# Patient Record
Sex: MALE | Race: WHITE | URBAN - METROPOLITAN AREA
[De-identification: names, ages, dates, MRNs, and addresses within clinical notes are randomized per-mention and may not be internally consistent; named-entity substitution may affect disease eponyms.]

---

## 2017-06-04 ENCOUNTER — EMERGENCY (EMERGENCY)
Facility: HOSPITAL | Age: 52
LOS: 1 days | Discharge: HOME | End: 2017-06-04
Attending: EMERGENCY MEDICINE | Admitting: EMERGENCY MEDICINE
Payer: MEDICAID

## 2017-06-04 VITALS
WEIGHT: 149.91 LBS | TEMPERATURE: 98 F | HEART RATE: 78 BPM | RESPIRATION RATE: 18 BRPM | DIASTOLIC BLOOD PRESSURE: 98 MMHG | OXYGEN SATURATION: 100 % | SYSTOLIC BLOOD PRESSURE: 151 MMHG | HEIGHT: 68 IN

## 2017-06-04 PROCEDURE — 73140 X-RAY EXAM OF FINGER(S): CPT | Mod: 26,LT

## 2017-06-04 PROCEDURE — 99283 EMERGENCY DEPT VISIT LOW MDM: CPT | Mod: 25

## 2017-06-04 RX ORDER — CEPHALEXIN 500 MG
500 CAPSULE ORAL ONCE
Qty: 0 | Refills: 0 | Status: COMPLETED | OUTPATIENT
Start: 2017-06-04 | End: 2017-06-04

## 2017-06-04 RX ORDER — CEPHALEXIN 500 MG
1 CAPSULE ORAL
Qty: 28 | Refills: 0 | OUTPATIENT
Start: 2017-06-04 | End: 2017-06-11

## 2017-06-04 RX ADMIN — Medication 500 MILLIGRAM(S): at 21:18

## 2017-06-04 NOTE — ED PROVIDER NOTE - PHYSICAL EXAMINATION
L first finger, erythema and mild edema over pad of finger, TTP, no induration, no flucutance, skin intact

## 2017-06-04 NOTE — ED PROVIDER NOTE - MEDICAL DECISION MAKING DETAILS
patient with L thumb swelling and pain x 2-3 days that he believes may have been a retained splinter or prick from a prema thorn. will do xray to evaluate for foreign body and put on keflex. advised follow up with PCP and dermatologist

## 2017-06-04 NOTE — ED ADULT TRIAGE NOTE - CHIEF COMPLAINT QUOTE
Patient to ED with complaint of left thumb pain and swelling.  Believes it is due to a splinter and that it is becoming infected.  Patient in no distress

## 2017-06-08 DIAGNOSIS — L03.012 CELLULITIS OF LEFT FINGER: ICD-10-CM

## 2020-11-07 ENCOUNTER — EMERGENCY (EMERGENCY)
Facility: HOSPITAL | Age: 55
LOS: 1 days | Discharge: HOME | End: 2020-11-07
Attending: EMERGENCY MEDICINE | Admitting: EMERGENCY MEDICINE
Payer: COMMERCIAL

## 2020-11-07 VITALS
SYSTOLIC BLOOD PRESSURE: 160 MMHG | HEIGHT: 68 IN | WEIGHT: 154.98 LBS | RESPIRATION RATE: 16 BRPM | HEART RATE: 98 BPM | OXYGEN SATURATION: 96 % | TEMPERATURE: 99 F | DIASTOLIC BLOOD PRESSURE: 93 MMHG

## 2020-11-07 VITALS
OXYGEN SATURATION: 96 % | TEMPERATURE: 98 F | DIASTOLIC BLOOD PRESSURE: 103 MMHG | SYSTOLIC BLOOD PRESSURE: 152 MMHG | RESPIRATION RATE: 16 BRPM | HEART RATE: 66 BPM

## 2020-11-07 DIAGNOSIS — M10.9 GOUT, UNSPECIFIED: ICD-10-CM

## 2020-11-07 DIAGNOSIS — M79.672 PAIN IN LEFT FOOT: ICD-10-CM

## 2020-11-07 LAB
ANION GAP SERPL CALC-SCNC: 11 MMOL/L — SIGNIFICANT CHANGE UP (ref 9–16)
BUN SERPL-MCNC: 13 MG/DL — SIGNIFICANT CHANGE UP (ref 7–23)
CALCIUM SERPL-MCNC: 8.8 MG/DL — SIGNIFICANT CHANGE UP (ref 8.5–10.5)
CHLORIDE SERPL-SCNC: 105 MMOL/L — SIGNIFICANT CHANGE UP (ref 96–108)
CO2 SERPL-SCNC: 25 MMOL/L — SIGNIFICANT CHANGE UP (ref 22–31)
CREAT SERPL-MCNC: 0.92 MG/DL — SIGNIFICANT CHANGE UP (ref 0.5–1.3)
GLUCOSE SERPL-MCNC: 99 MG/DL — SIGNIFICANT CHANGE UP (ref 70–99)
HCT VFR BLD CALC: 39.6 % — SIGNIFICANT CHANGE UP (ref 39–50)
HGB BLD-MCNC: 13.2 G/DL — SIGNIFICANT CHANGE UP (ref 13–17)
MCHC RBC-ENTMCNC: 30.6 PG — SIGNIFICANT CHANGE UP (ref 27–34)
MCHC RBC-ENTMCNC: 33.3 GM/DL — SIGNIFICANT CHANGE UP (ref 32–36)
MCV RBC AUTO: 91.7 FL — SIGNIFICANT CHANGE UP (ref 80–100)
NRBC # BLD: 0 /100 WBCS — SIGNIFICANT CHANGE UP (ref 0–0)
PLATELET # BLD AUTO: 207 K/UL — SIGNIFICANT CHANGE UP (ref 150–400)
POTASSIUM SERPL-MCNC: 4.1 MMOL/L — SIGNIFICANT CHANGE UP (ref 3.5–5.3)
POTASSIUM SERPL-SCNC: 4.1 MMOL/L — SIGNIFICANT CHANGE UP (ref 3.5–5.3)
RBC # BLD: 4.32 M/UL — SIGNIFICANT CHANGE UP (ref 4.2–5.8)
RBC # FLD: 11.9 % — SIGNIFICANT CHANGE UP (ref 10.3–14.5)
SODIUM SERPL-SCNC: 141 MMOL/L — SIGNIFICANT CHANGE UP (ref 132–145)
WBC # BLD: 11.94 K/UL — HIGH (ref 3.8–10.5)
WBC # FLD AUTO: 11.94 K/UL — HIGH (ref 3.8–10.5)

## 2020-11-07 PROCEDURE — 99284 EMERGENCY DEPT VISIT MOD MDM: CPT | Mod: 25

## 2020-11-07 PROCEDURE — 73630 X-RAY EXAM OF FOOT: CPT | Mod: 26,LT

## 2020-11-07 RX ORDER — INDOMETHACIN 50 MG
1 CAPSULE ORAL
Qty: 45 | Refills: 0
Start: 2020-11-07 | End: 2020-11-21

## 2020-11-07 RX ORDER — COLCHICINE 0.6 MG
1 TABLET ORAL
Qty: 30 | Refills: 0
Start: 2020-11-07 | End: 2020-12-06

## 2020-11-07 RX ORDER — INDOMETHACIN 50 MG
1 CAPSULE ORAL
Qty: 15 | Refills: 0
Start: 2020-11-07 | End: 2020-11-21

## 2020-11-07 RX ORDER — INDOMETHACIN 50 MG
25 CAPSULE ORAL ONCE
Refills: 0 | Status: COMPLETED | OUTPATIENT
Start: 2020-11-07 | End: 2020-11-07

## 2020-11-07 RX ORDER — COLCHICINE 0.6 MG
1.2 TABLET ORAL ONCE
Refills: 0 | Status: COMPLETED | OUTPATIENT
Start: 2020-11-07 | End: 2020-11-07

## 2020-11-07 RX ORDER — COLCHICINE 0.6 MG
0.6 TABLET ORAL ONCE
Refills: 0 | Status: COMPLETED | OUTPATIENT
Start: 2020-11-07 | End: 2020-11-07

## 2020-11-07 RX ADMIN — Medication 1.2 MILLIGRAM(S): at 21:41

## 2020-11-07 RX ADMIN — Medication 25 MILLIGRAM(S): at 21:41

## 2020-11-07 NOTE — ED PROVIDER NOTE - PATIENT PORTAL LINK FT
You can access the FollowMyHealth Patient Portal offered by [facility] by registering at the following website: [email or web address]. By joining [facility]’s FollowMyHealth portal, you will also be able to view your health information using other applications (apps) compatible with our system.

## 2020-11-07 NOTE — ED PROVIDER NOTE - OBJECTIVE STATEMENT
56 yo male presents with atraumatic left foot pain at 1st mcp x several weeks worse over last several days.     no fever no chills  no cp no sob no abd pain 54 yo male presents with atraumatic left foot pain at 1st mcp x several weeks worse over last several days.     history of arthritis in joints.   no fever no chills  no cp no sob no abd pain

## 2020-11-07 NOTE — ED PROVIDER NOTE - DIAGNOSTIC INTERPRETATION
Interpreted by ED physician Alan CRAVEN  left foot x-ray, 3 vews  No fracture, no dislocation (joint spaces grossly normal), no Foreign Body noted, + sts 1st toe area no sc air

## 2020-11-07 NOTE — ED PROVIDER NOTE - NSFOLLOWUPCLINICS_GEN_ALL_ED_FT
[facility] Pod[facility]  Podiatry  [address] [place] [address]  Phone: [phone]  Fax:   Follow Up Time: 1-3 Days

## 2020-11-07 NOTE — ED ADULT NURSE NOTE - CHIEF COMPLAINT QUOTE
Pt walked in c/o L foot pain/redness x2 days. States has been having pain in foot for 1 week, but noticed incr redness and swelling throughout L foot. Denies trauma to lower extremities, pain worsens with walking. No calf pain, afebrile, took 2 tylenol pills 1 hour PTA.

## 2020-11-07 NOTE — ED PROVIDER NOTE - MUSCULOSKELETAL, MLM
+ left foot swelling tenderness and erythema at 1st mcp. able to actively move 1st toe without pain skin intact mildly warm + dp and pt pulse + left foot swelling tenderness and erythema greatest at 1st mcp. able to actively move 1st toe without pain at mcp joint skin intact mildly warm + dp and pt pulse sensory normal no blisters no crepitus + left foot swelling tenderness and erythema greatest at 1st mcp. able to actively move 1st toe with mild pain at 1st mcp joint skin intact mildly warm + dp and pt pulse sensory normal no blisters no crepitus + left foot swelling tenderness and erythema greatest at 1st mcp. able to actively move 1st toe with mild pain at 1st mcp joint skin intact mildly warm + dp and pt pulse sensory normal no blisters no crepitus. no break in skin between toes.

## 2020-11-07 NOTE — ED ADULT TRIAGE NOTE - CHIEF COMPLAINT QUOTE
Pt walked in c/o L foot pain/redness x2 days. States has been having pain in foot for 1 week, but noticed incr redness and swelling throughout L foot. Denies trauma to lower extremities, pain worsens with walking. Afebrile, took 2 tylenol pills 1 hour PTA. Pt walked in c/o L foot pain/redness x2 days. States has been having pain in foot for 1 week, but noticed incr redness and swelling throughout L foot. Denies trauma to lower extremities, pain worsens with walking. No calf pain, afebrile, took 2 tylenol pills 1 hour PTA.

## 2020-11-07 NOTE — ED ADULT NURSE NOTE - OBJECTIVE STATEMENT
Pt with Lt foot redness/swelling x2 days. No reported injury. Pt denies fever. Foot is hot to touch. Pt with prior Hx of cellulitis to Lt hand

## 2020-11-07 NOTE — ED PROVIDER NOTE - NSFOLLOWUPINSTRUCTIONS_ED_ALL_ED_FT
take medications as directed     follow up with your doctor next week and/or with podiatry     limit use of foot / keep elevated until symptoms resolve     return to ER for fever worsening of foot pain or swelling or any other concerns

## 2020-11-08 RX ADMIN — Medication 0.6 MILLIGRAM(S): at 00:20

## 2020-11-08 RX ADMIN — Medication 25 MILLIGRAM(S): at 00:20

## 2021-03-21 ENCOUNTER — APPOINTMENT (OUTPATIENT)
Age: 56
End: 2021-03-21
Payer: COMMERCIAL

## 2021-03-21 PROCEDURE — 0001A: CPT

## 2023-11-15 NOTE — ED PROVIDER NOTE - BIRTH SEX
Problem: Adult Mental Health  Goal: Reports decrease in thoughts of suicide  Outcome: Outcome Not Met, Continue to Monitor     Problem: Adult Mental Health  Goal: Demonstrates the ability to engage in reality-based communication and refrains from acting on delusional thoughts  Outcome: Outcome Not Met, Continue to Monitor     Problem: Adult Mental Health  Goal: Reports or exhibits an improvement in mood signs/symptoms associated with anxiety  Outcome: Outcome Not Met, Continue to Monitor     Problem: Adult Mental Health  Goal: Reports or exhibits reduction in symptoms associated with elevated or labile mood/rafal  Outcome: Outcome Not Met, Continue to Monitor      Unknown

## 2024-02-29 NOTE — ED PROVIDER NOTE - CROS ED CONS ALL NEG
Left lower quadrant abdominal pain is much diminished and almost gone   The results of the CT abdomen pelvis normal without any evidence pathology   Laboratory urinary normal   Plans for him to monitor this situation if it returns or  increases in severity to call us back.   negative...

## 2024-04-09 NOTE — ED PROVIDER NOTE - OBJECTIVE STATEMENT
Is the patient currently in the state of [place]? YES    Visit mode:VIDEO    If the visit is dropped, the patient can be reconnected by: VIDEO VISIT: Text to cell phone:   Telephone Information:   Mobile [phone]    and VIDEO VISIT: Send to e-mail at: [email or web address]    Will anyone else be joining the visit? NO  (If patient encounters technical issues they should call [phone]    How would you like to obtain your AVS? MyChart    Are changes needed to the allergy or medication list? No    Are refills needed on medications prescribed by this physician? NO    Reason for visit: ZOYA JACOBS       patient presents with painful swelling over L 1st finger x 2-3 days. patient remembers getting a splinter or a prick from a prema thorn one week ago and had attempted to remove the splinter. denies fever, chills, nightsweats, streaking up the arm. patient is a

## 2025-09-03 ENCOUNTER — EMERGENCY (EMERGENCY)
Age: 60
LOS: 1 days | End: 2025-09-03
Admitting: EMERGENCY MEDICINE
Payer: COMMERCIAL

## 2025-09-03 VITALS
DIASTOLIC BLOOD PRESSURE: 96 MMHG | TEMPERATURE: 99 F | OXYGEN SATURATION: 98 % | RESPIRATION RATE: 15 BRPM | WEIGHT: 154.98 LBS | HEART RATE: 78 BPM | SYSTOLIC BLOOD PRESSURE: 149 MMHG

## 2025-09-03 PROCEDURE — 99284 EMERGENCY DEPT VISIT MOD MDM: CPT

## 2025-09-03 RX ORDER — DIAZEPAM 5 MG/1
5 TABLET ORAL ONCE
Refills: 0 | Status: DISCONTINUED | OUTPATIENT
Start: 2025-09-03 | End: 2025-09-03

## 2025-09-03 RX ORDER — LIDOCAINE HYDROCHLORIDE 20 MG/ML
1 JELLY TOPICAL ONCE
Refills: 0 | Status: COMPLETED | OUTPATIENT
Start: 2025-09-03 | End: 2025-09-03

## 2025-09-03 RX ORDER — IBUPROFEN 200 MG
600 TABLET ORAL ONCE
Refills: 0 | Status: COMPLETED | OUTPATIENT
Start: 2025-09-03 | End: 2025-09-03

## 2025-09-05 DIAGNOSIS — M25.552 PAIN IN LEFT HIP: ICD-10-CM
